# Patient Record
(demographics unavailable — no encounter records)

---

## 2025-04-15 NOTE — COUNSELING
[Contraception/ Emergency Contraception/ Safe Sexual Practices] : contraception, emergency contraception, safe sexual practices [Preconception Care/ Fertility options] : preconception care, fertility options [Pregnancy Options] : pregnancy options [Lab Results] : lab results [Medication Management] : medication management

## 2025-04-15 NOTE — PHYSICAL EXAM
[MA] : MA [Appropriately responsive] : appropriately responsive [Alert] : alert [No Acute Distress] : no acute distress [No Lymphadenopathy] : no lymphadenopathy [Regular Rate Rhythm] : regular rate rhythm [No Murmurs] : no murmurs [Clear to Auscultation B/L] : clear to auscultation bilaterally [Soft] : soft [Non-tender] : non-tender [Non-distended] : non-distended [No Lesions] : no lesions [No Mass] : no mass [Oriented x3] : oriented x3 [Examination Of The Breasts] : a normal appearance [No Masses] : no breast masses were palpable [Labia Majora] : normal [Labia Minora] : normal [Normal] : normal [Uterine Adnexae] : normal [FreeTextEntry2] : lorie

## 2025-04-15 NOTE — HISTORY OF PRESENT ILLNESS
[Y] : Positive pregnancy history [Normal Amount/Duration] :  normal amount and duration [Currently Active] : currently active [Men] : men [No] : No [Yes] : pregnancy [Regular Cycle Intervals] : periods have been regular [Frequency: Q ___ days] : menstrual periods occur approximately every [unfilled] days [PGHxAbortions] : 1 [PGxTotal] : 6 [[facility] CorporationxLiving] : 2 [PGxABSpont] : 3 [FreeTextEntry1] : 03/30/2025

## 2025-04-15 NOTE — PLAN
[FreeTextEntry1] : annual; pap and hpv  may consider TTC again but worried about some sx: hot , sweating more easily d/w pt checking AMH    x 2  right now just doing pull out

## 2025-04-15 NOTE — REVIEW OF SYSTEMS
[Anxiety] : anxiety [Depression] : depression [FreeTextEntry7] : *just started metformin to help wt loss [de-identified] : started zoloft 7 yrs ago